# Patient Record
Sex: FEMALE
[De-identification: names, ages, dates, MRNs, and addresses within clinical notes are randomized per-mention and may not be internally consistent; named-entity substitution may affect disease eponyms.]

---

## 2018-05-17 ENCOUNTER — HOSPITAL ENCOUNTER (EMERGENCY)
Dept: HOSPITAL 31 - C.ER | Age: 83
LOS: 1 days | Discharge: HOME | End: 2018-05-18
Payer: MEDICARE

## 2018-05-17 VITALS — TEMPERATURE: 97.7 F

## 2018-05-17 DIAGNOSIS — K29.70: Primary | ICD-10-CM

## 2018-05-17 DIAGNOSIS — I48.91: ICD-10-CM

## 2018-05-17 DIAGNOSIS — T40.4X5A: ICD-10-CM

## 2018-05-17 DIAGNOSIS — R10.9: ICD-10-CM

## 2018-05-17 DIAGNOSIS — I10: ICD-10-CM

## 2018-05-17 LAB
ALBUMIN SERPL-MCNC: 4.3 G/DL (ref 3.5–5)
ALBUMIN/GLOB SERPL: 1.4 {RATIO} (ref 1–2.1)
ALT SERPL-CCNC: 29 U/L (ref 9–52)
ANISOCYTOSIS BLD QL SMEAR: SLIGHT
APTT BLD: 37 SECONDS (ref 21–34)
AST SERPL-CCNC: 24 U/L (ref 14–36)
BASOPHILS # BLD AUTO: 0 K/UL (ref 0–0.2)
BASOPHILS NFR BLD: 0.7 % (ref 0–2)
BASOPHILS NFR BLD: 1 % (ref 0–2)
BILIRUB UR-MCNC: NEGATIVE MG/DL
BUN SERPL-MCNC: 14 MG/DL (ref 7–17)
CALCIUM SERPL-MCNC: 9 MG/DL (ref 8.6–10.4)
EOSINOPHIL # BLD AUTO: 0 K/UL (ref 0–0.7)
EOSINOPHIL NFR BLD: 0 % (ref 0–4)
ERYTHROCYTE [DISTWIDTH] IN BLOOD BY AUTOMATED COUNT: 14.8 % (ref 11.5–14.5)
GFR NON-AFRICAN AMERICAN: > 60
GLUCOSE UR STRIP-MCNC: NORMAL MG/DL
HGB BLD-MCNC: 14.3 G/DL (ref 11–16)
INR PPP: 1.3
LEUKOCYTE ESTERASE UR-ACNC: (no result) LEU/UL
LIPASE: 91 U/L (ref 23–300)
LYMPHOCYTE: 6 % (ref 20–40)
LYMPHOCYTES # BLD AUTO: 0.6 K/UL (ref 1–4.3)
LYMPHOCYTES NFR BLD AUTO: 8 % (ref 20–40)
MCH RBC QN AUTO: 30.8 PG (ref 27–31)
MCHC RBC AUTO-ENTMCNC: 35.1 G/DL (ref 33–37)
MCV RBC AUTO: 87.8 FL (ref 81–99)
MICROCYTES BLD QL SMEAR: SLIGHT
MONOCYTE: 4 % (ref 0–10)
MONOCYTES # BLD: 0.3 K/UL (ref 0–0.8)
MONOCYTES NFR BLD: 4.2 % (ref 0–10)
NEUTROPHILS # BLD: 6.6 K/UL (ref 1.8–7)
NEUTROPHILS NFR BLD AUTO: 87 % (ref 50–75)
NEUTROPHILS NFR BLD AUTO: 87.1 % (ref 50–75)
NEUTS BAND NFR BLD: 2 % (ref 0–2)
NRBC BLD AUTO-RTO: 0.2 % (ref 0–2)
PH UR STRIP: 7 [PH] (ref 5–8)
PLATELET # BLD EST: NORMAL 10*3/UL
PLATELET # BLD: 296 K/UL (ref 130–400)
PMV BLD AUTO: 6.6 FL (ref 7.2–11.7)
PROT UR STRIP-MCNC: NEGATIVE MG/DL
PROTHROMBIN TIME: 14.4 SECONDS (ref 9.7–12.2)
RBC # BLD AUTO: 4.64 MIL/UL (ref 3.8–5.2)
RBC # UR STRIP: (no result) /UL
SP GR UR STRIP: 1.01 (ref 1–1.03)
TOTAL CELLS COUNTED BLD: 100
UROBILINOGEN UR-MCNC: 2 MG/DL (ref 0.2–1)
WBC # BLD AUTO: 7.6 K/UL (ref 4.8–10.8)

## 2018-05-17 PROCEDURE — 85610 PROTHROMBIN TIME: CPT

## 2018-05-17 PROCEDURE — 85025 COMPLETE CBC W/AUTO DIFF WBC: CPT

## 2018-05-17 PROCEDURE — 99285 EMERGENCY DEPT VISIT HI MDM: CPT

## 2018-05-17 PROCEDURE — 85730 THROMBOPLASTIN TIME PARTIAL: CPT

## 2018-05-17 PROCEDURE — 81001 URINALYSIS AUTO W/SCOPE: CPT

## 2018-05-17 PROCEDURE — 80053 COMPREHEN METABOLIC PANEL: CPT

## 2018-05-17 PROCEDURE — 83690 ASSAY OF LIPASE: CPT

## 2018-05-17 PROCEDURE — 96374 THER/PROPH/DIAG INJ IV PUSH: CPT

## 2018-05-17 NOTE — C.PDOC
History Of Present Illness


The patient presents to the ED for evaluation of abdominal pain which began 

earlier today. Patient states she began experiencing pain to her "upper stomach

" region after she took Tramadol for her rib pain. She denies fever, chills, 

nausea, vomiting. 


Time Seen by Provider: 05/17/18 21:38


Chief Complaint (Nursing): Abdominal Pain


History Per: Patient


History/Exam Limitations: no limitations


Onset/Duration Of Symptoms: Hrs


Current Symptoms Are (Timing): Still Present


Severity: Mild


Pain Scale Rating Of: 2


Location Of Pain/Discomfort: Other (upper abdomen )


Radiation Of Pain To:: None


Quality Of Discomfort: "Pain"


Associated Symptoms: denies: Fever, Chills, Nausea, Vomiting


Exacerbating Factors: None


Alleviating Factors: None


Last Bowel Movement: Today


Recent travel outside of the [place]: No


Additional History Per: Patient


Abnormal Vaginal Bleeding: No





Past Medical History


Reviewed: Historical Data, Nursing Documentation, Vital Signs


Vital Signs: 


 Last Vital Signs











Temp  97.7 F   05/17/18 21:33


 


Pulse  72   05/17/18 22:31


 


Resp  20   05/17/18 22:31


 


BP  156/89 H  05/17/18 22:31


 


Pulse Ox  98   05/17/18 23:41














- Medical History


PMH: Arthritis (KNEES), Atrial Fibrillation, HTN


Surgical History: No Surg Hx





- [facility] Procedures








CORONAR ARTERIOGR-2 CATH (03/27/07)


RT & LT HEART ANGIOCARD (03/27/07)


RT/LEFT HEART CARD CATH (03/27/07)








Family History: States: Unknown Family Hx





- Social History


Hx Alcohol Use: No


Hx Substance Use: No





- Immunization History


Hx Tetanus Toxoid Vaccination: No


Hx Influenza Vaccination: Yes


Hx Pneumococcal Vaccination: No





Review Of Systems


Constitutional: Negative for: Fever, Chills


Cardiovascular: Negative for: Chest Pain, Palpitations


Respiratory: Negative for: Cough, Shortness of Breath


Gastrointestinal: Positive for: Abdominal Pain.  Negative for: Nausea, Vomiting

, Diarrhea, Constipation


Musculoskeletal: Positive for: Other (rib pain )


Skin: Negative for: Rash, Lesions, Jaundice, Bruising


Neurological: Negative for: Weakness, Numbness





Physical Exam





- Physical Exam


Appears: Non-toxic, No Acute Distress


Skin: Warm, Dry


Head: Normacephalic


Eye(s): bilateral: Normal Inspection


Oral Mucosa: Moist


Neck: Supple


Chest: Symmetrical, No Deformity, No Tenderness


Cardiovascular: Rhythm Regular, No Murmur


Respiratory: No Rales, No Rhonchi, No Wheezing


Gastrointestinal/Abdominal: Soft, Tenderness (mild, to mid-epigastric region ), 

No Guarding, No Rebound


Extremity: Normal ROM, Capillary Refill (less than 2 seconds )


Neurological/Psych: Oriented x3


Gait: Steady





ED Course And Treatment





- Laboratory Results


Result Diagrams: 


 05/17/18 21:49





 05/17/18 21:49


ECG: Interpreted By Me, Viewed By Me


ECG Rhythm: Sinus Rhythm (75), 1st Degree HB, Nonspecific Changes


O2 Sat by Pulse Oximetry: 98 (on RA)


Pulse Ox Interpretation: Normal


Progress Note: Bloodwork, urinalysis, EKG ordered.  Zofran IVP and IV Fluids 

administered.


Reevaluation Time: 23:37


Reassessment Condition: Improved





Medical Decision Making


Medical Decision Making: 








Upon provider reevaluation patient is feeling better, is medically stable, and 

requires no further treatment in the ED at this time. Patient will be 

discharged home  . Counseling was provided and all questions were answered 

regarding diagnosis and need for follow up with dr murray. There is agreement 

to discharge plan. Return if symptoms persist or worsen.





Disposition


Counseled Patient/Family Regarding: Studies Performed, Diagnosis, Need For 

Followup, Rx Given





- Disposition


Referrals: 


Trae Murray MD [Staff Provider] - 


Disposition: HOME/ ROUTINE


Disposition Time: 21:39


Condition: FAIR


Instructions:  Gastritis (DC), Adverse Drug Reactions, Adult (DC)


Forms:  [facility] (English)





- Clinical Impression


Clinical Impression: 


 Abdominal pain, Gastritis, Medication reaction








- Scribe Statement


The provider has reviewed the documentation as recorded by the Scribe (Heather Corea)


Provider Attestation: 








All medical record entries made by the Scribe were at my direction and 

personally dictated by me. I have reviewed the chart and agree that the record 

accurately reflects my personal performance of the history, physical exam, 

medical decision making, and the department course for this patient. I have 

also personally directed, reviewed, and agree with the discharge instructions 

and disposition.

## 2018-05-18 VITALS
SYSTOLIC BLOOD PRESSURE: 163 MMHG | OXYGEN SATURATION: 95 % | RESPIRATION RATE: 17 BRPM | HEART RATE: 75 BPM | DIASTOLIC BLOOD PRESSURE: 86 MMHG

## 2018-06-28 ENCOUNTER — HOSPITAL ENCOUNTER (EMERGENCY)
Dept: HOSPITAL 31 - C.ER | Age: 83
LOS: 1 days | Discharge: HOME | End: 2018-06-29
Payer: MEDICARE

## 2018-06-28 VITALS — DIASTOLIC BLOOD PRESSURE: 87 MMHG | SYSTOLIC BLOOD PRESSURE: 144 MMHG

## 2018-06-28 DIAGNOSIS — I10: ICD-10-CM

## 2018-06-28 DIAGNOSIS — N39.0: Primary | ICD-10-CM

## 2018-06-28 DIAGNOSIS — I48.91: ICD-10-CM

## 2018-06-28 LAB
ALBUMIN SERPL-MCNC: 4.6 G/DL (ref 3.5–5)
ALBUMIN/GLOB SERPL: 1.5 {RATIO} (ref 1–2.1)
ALT SERPL-CCNC: 35 U/L (ref 9–52)
AST SERPL-CCNC: 33 U/L (ref 14–36)
BASOPHILS # BLD AUTO: 0.1 K/UL (ref 0–0.2)
BASOPHILS NFR BLD: 1.1 % (ref 0–2)
BILIRUB UR-MCNC: NEGATIVE MG/DL
BUN SERPL-MCNC: 11 MG/DL (ref 7–17)
CALCIUM SERPL-MCNC: 9.3 MG/DL (ref 8.6–10.4)
EOSINOPHIL # BLD AUTO: 0 K/UL (ref 0–0.7)
EOSINOPHIL NFR BLD: 0 % (ref 0–4)
ERYTHROCYTE [DISTWIDTH] IN BLOOD BY AUTOMATED COUNT: 14.9 % (ref 11.5–14.5)
GFR NON-AFRICAN AMERICAN: > 60
GLUCOSE UR STRIP-MCNC: NORMAL MG/DL
HGB BLD-MCNC: 14.1 G/DL (ref 11–16)
LEUKOCYTE ESTERASE UR-ACNC: (no result) LEU/UL
LIPASE: 149 U/L (ref 23–300)
LYMPHOCYTES # BLD AUTO: 1 K/UL (ref 1–4.3)
LYMPHOCYTES NFR BLD AUTO: 20.5 % (ref 20–40)
MCH RBC QN AUTO: 29.8 PG (ref 27–31)
MCHC RBC AUTO-ENTMCNC: 33.5 G/DL (ref 33–37)
MCV RBC AUTO: 89 FL (ref 81–99)
MONOCYTES # BLD: 0.5 K/UL (ref 0–0.8)
MONOCYTES NFR BLD: 9.8 % (ref 0–10)
NEUTROPHILS # BLD: 3.3 K/UL (ref 1.8–7)
NEUTROPHILS NFR BLD AUTO: 68.6 % (ref 50–75)
NRBC BLD AUTO-RTO: 0.1 % (ref 0–2)
PH UR STRIP: 7 [PH] (ref 5–8)
PLATELET # BLD: 257 K/UL (ref 130–400)
PMV BLD AUTO: 6.7 FL (ref 7.2–11.7)
PROT UR STRIP-MCNC: NEGATIVE MG/DL
RBC # BLD AUTO: 4.74 MIL/UL (ref 3.8–5.2)
RBC # UR STRIP: (no result) /UL
SP GR UR STRIP: 1 (ref 1–1.03)
UROBILINOGEN UR-MCNC: NORMAL MG/DL (ref 0.2–1)
WBC # BLD AUTO: 4.8 K/UL (ref 4.8–10.8)

## 2018-06-28 NOTE — C.PDOC
History Of Present Illness


89 y/o female presents to the ED via EMS with vague complaint of intermittent 

abdominal pain for the past several months. Pain comes and goes, and is 

occasionally associated with difficulty breathing. Patient's daughter called 

EMS this evening. On arrival, patient has no pain at this time. Patient is a 

very poor historian, and the only PMHx she calls is hypertension. Otherwise 

patient denies any nausea, vomiting, fever, change in bowel habits, or loss of 

appetite.





PMD: Dr. Trae Shepard 


Time Seen by Provider: 06/28/18 19:22


Chief Complaint (Nursing): Abdominal Pain


History Per: Patient


History/Exam Limitations: other (poor historian)


Onset/Duration Of Symptoms: Intermittent Episodes


Current Symptoms Are (Timing): Gone





Past Medical History


Reviewed: Historical Data, Nursing Documentation, Vital Signs


Vital Signs: 


 Last Vital Signs











Temp  98.0 F   06/28/18 19:12


 


Pulse  67   06/28/18 19:12


 


Resp  14   06/28/18 19:12


 


BP  106/67   06/28/18 19:12


 


Pulse Ox  100   06/28/18 22:28














- Medical History


PMH: Arthritis (KNEES), Atrial Fibrillation, HTN


Other Surgeries: Hysterectomy





- [facility] Procedures








CORONAR ARTERIOGR-2 CATH (03/27/07)


RT & LT HEART ANGIOCARD (03/27/07)


RT/LEFT HEART CARD CATH (03/27/07)








Family History: States: Unknown Family Hx





- Social History


Hx Tobacco Use: No


Hx Alcohol Use: No


Hx Substance Use: No





- Immunization History


Hx Tetanus Toxoid Vaccination: No


Hx Influenza Vaccination: Yes


Hx Pneumococcal Vaccination: No





Review Of Systems


Except As Marked, All Systems Reviewed And Found Negative.


Constitutional: Negative for: Fever, Chills


Gastrointestinal: Positive for: Abdominal Pain (now resolved).  Negative for: 

Nausea, Vomiting, Diarrhea, Constipation, Other (change in appetite)





Physical Exam





- Physical Exam


Appears: Non-toxic, No Acute Distress, Other (Awake, alert)


Skin: Dry, Pale, No Cyanotic


Head: Atraumatic, Normacephalic


Eye(s): bilateral: Normal Inspection, PERRL, EOMI


Oral Mucosa: Moist


Neck: Normal ROM, Supple


Chest: Symmetrical


Cardiovascular: No Murmur, Other (S1, S2 are wnl)


Respiratory: Normal Breath Sounds (Lungs CTA bilaterally), No Rales, No Rhonchi

, No Wheezing


Gastrointestinal/Abdominal: Bowel Sounds (active bowel sounds, +tympanic to 

percussion), Soft, No Tenderness, Other (abdomen protuberant, with well-healed 

vertical scar in the midline suprapubic area)


Extremity: Bilateral: Atraumatic, Normal Color And Temperature (without edema, 

clubbing, or cyanosis), Normal ROM


Pulses: Left Dorsalis Pedis: Normal, Right Dorsalis Pedis: Normal


Neurological/Psych: Oriented x3, Normal Speech, Normal Cranial Nerves (CN 2-12 

intact), Other (No focal deficits)





ED Course And Treatment





- Laboratory Results


Result Diagrams: 


 06/28/18 20:00





 06/28/18 20:00


Lab Interpretation: Abnormal (UA indicative of UTI)


O2 Sat by Pulse Oximetry: 100 (RA)


Pulse Ox Interpretation: Normal





- Other Rad


  ** abd x-ray


X-Ray: Interpreted by Me, Viewed By Me


Interpretation: + Nonspecific bowel gas pattern, severe scoliosis, otherwise 

negative.





Medical Decision Making


Medical Decision Making: 


Impression: Abdominal pain, currently resolved





Initial Plan:


--Routine blood work


--Urinalysis


--Obstructive series x-ray





Progress/Updates:


X-ray shows nonspecific bowel gas pattern, no acute findings. 


Blood work is grossly normal. Urine shows + leuks, WBCs, and RBCs.





Final Impression: UTI


Plan is to discharge patient on PO antibiotics. Case was discussed with patient'

s PMD, Dr. Shepard, who agrees with management and will have patient follow up 

in the office. Patient verbalizes understanding of diagnosis and treatment 

plan. 





Disposition


Discussed With .: Trae Shepard


Doctor Will See Patient In The: Office


Counseled Patient/Family Regarding: Studies Performed, Diagnosis, Need For 

Followup, Rx Given





- Disposition


Referrals: 


Trae Shepard MD [Staff Provider] - 


Disposition: HOME/ ROUTINE


Disposition Time: 22:27


Condition: GOOD


Prescriptions: 


Ciprofloxacin HCl [Cipro] 500 mg PO BID #20 tablet


Instructions:  Urinary Tract Infections in Adults


Forms:  [facility] (English)


Print Language: ENGLISH





- POA


Present On Arrival: None





- Clinical Impression


Clinical Impression: 


 UTI (urinary tract infection)








- Scribe Statement


The provider has reviewed the documentation as recorded by the Scribe (Joy Arzate)


Provider Attestation: 





All medical record entries made by the Scribe were at my direction and 

personally dictated by me. I have reviewed the chart and agree that the record 

accurately reflects my personal performance of the history, physical exam, 

medical decision making, and the department course for this patient. I have 

also personally directed, reviewed, and agree with the discharge instructions 

and disposition.

## 2018-06-29 VITALS — RESPIRATION RATE: 20 BRPM | HEART RATE: 78 BPM | TEMPERATURE: 97.8 F

## 2018-06-29 VITALS — OXYGEN SATURATION: 100 %

## 2018-06-29 NOTE — CARD
--------------- APPROVED REPORT --------------





EKG Measurement

Heart [id number]

[id number]

[id number]

[id number]



<Conclusion>

Atrial fibrillation

Abnormal ECG

## 2018-06-29 NOTE — RAD
PROCEDURE:  Radiographs of the chest and abdomen (obstructive series)



HISTORY:

abd pain  



COMPARISON:

No prior.



TECHNIQUE:

AP radiograph of the chest, with upright and supine radiographs of 

the abdomen.



FINDINGS:



CHEST:

Lungs: Clear.



Cardiovascular: Dextro cardia.  Left aortic arch. Shift of the heart 

and mediastinum towards the right side.



Pleura: No pleural fluid. No pneumothorax.



Other findings: None.



ABDOMEN AND PELVIS:

Bowel: Unremarkable bowel gas pattern. No evidence of mechanical 

obstruction.



Free air: None.



Bones:  Status post ORIF right intertrochanteric fracture.



Other findings: None.



IMPRESSION:

No acute infiltrate.  No evidence of bowel obstruction.  Dextro 

cardia with left aortic arch. Shift of heart and mediastinum towards 

the left.

## 2018-07-02 ENCOUNTER — HOSPITAL ENCOUNTER (INPATIENT)
Dept: HOSPITAL 31 - C.ER | Age: 83
LOS: 1 days | Discharge: HOME | DRG: 690 | End: 2018-07-03
Attending: INTERNAL MEDICINE | Admitting: INTERNAL MEDICINE
Payer: MEDICARE

## 2018-07-02 VITALS — RESPIRATION RATE: 20 BRPM

## 2018-07-02 DIAGNOSIS — I48.91: ICD-10-CM

## 2018-07-02 DIAGNOSIS — K56.41: ICD-10-CM

## 2018-07-02 DIAGNOSIS — N18.9: ICD-10-CM

## 2018-07-02 DIAGNOSIS — K57.90: ICD-10-CM

## 2018-07-02 DIAGNOSIS — F41.9: ICD-10-CM

## 2018-07-02 DIAGNOSIS — M17.0: ICD-10-CM

## 2018-07-02 DIAGNOSIS — H40.9: ICD-10-CM

## 2018-07-02 DIAGNOSIS — I12.9: ICD-10-CM

## 2018-07-02 DIAGNOSIS — N39.0: Primary | ICD-10-CM

## 2018-07-02 DIAGNOSIS — D32.9: ICD-10-CM

## 2018-07-02 LAB
ALBUMIN SERPL-MCNC: 4.2 G/DL (ref 3.5–5)
ALBUMIN/GLOB SERPL: 1.5 {RATIO} (ref 1–2.1)
ALT SERPL-CCNC: 28 U/L (ref 9–52)
AST SERPL-CCNC: 28 U/L (ref 14–36)
BACTERIA #/AREA URNS HPF: (no result) /[HPF]
BASOPHILS # BLD AUTO: 0.1 K/UL (ref 0–0.2)
BASOPHILS NFR BLD: 1.3 % (ref 0–2)
BILIRUB UR-MCNC: NEGATIVE MG/DL
BUN SERPL-MCNC: 13 MG/DL (ref 7–17)
CALCIUM SERPL-MCNC: 9 MG/DL (ref 8.6–10.4)
EOSINOPHIL # BLD AUTO: 0 K/UL (ref 0–0.7)
EOSINOPHIL NFR BLD: 0 % (ref 0–4)
ERYTHROCYTE [DISTWIDTH] IN BLOOD BY AUTOMATED COUNT: 14.6 % (ref 11.5–14.5)
GFR NON-AFRICAN AMERICAN: > 60
GLUCOSE UR STRIP-MCNC: NORMAL MG/DL
HGB BLD-MCNC: 13.5 G/DL (ref 11–16)
INR PPP: 2.4
LEUKOCYTE ESTERASE UR-ACNC: (no result) LEU/UL
LIPASE: 118 U/L (ref 23–300)
LYMPHOCYTES # BLD AUTO: 1.1 K/UL (ref 1–4.3)
LYMPHOCYTES NFR BLD AUTO: 20.5 % (ref 20–40)
MCH RBC QN AUTO: 30.1 PG (ref 27–31)
MCHC RBC AUTO-ENTMCNC: 34 G/DL (ref 33–37)
MCV RBC AUTO: 88.6 FL (ref 81–99)
MONOCYTES # BLD: 0.5 K/UL (ref 0–0.8)
MONOCYTES NFR BLD: 9.4 % (ref 0–10)
NEUTROPHILS # BLD: 3.7 K/UL (ref 1.8–7)
NEUTROPHILS NFR BLD AUTO: 68.8 % (ref 50–75)
NRBC BLD AUTO-RTO: 0 % (ref 0–2)
PH UR STRIP: 7 [PH] (ref 5–8)
PLATELET # BLD: 238 K/UL (ref 130–400)
PMV BLD AUTO: 6.7 FL (ref 7.2–11.7)
PROT UR STRIP-MCNC: NEGATIVE MG/DL
PROTHROMBIN TIME: 26.1 SECONDS (ref 9.7–12.2)
RBC # BLD AUTO: 4.48 MIL/UL (ref 3.8–5.2)
RBC # UR STRIP: (no result) /UL
SP GR UR STRIP: 1 (ref 1–1.03)
SQUAMOUS EPITHIAL: < 1 /HPF (ref 0–5)
UROBILINOGEN UR-MCNC: NORMAL MG/DL (ref 0.2–1)
WBC # BLD AUTO: 5.3 K/UL (ref 4.8–10.8)

## 2018-07-02 NOTE — CP.PCM.HP
History of Present Illness





- History of Present Illness


History of Present Illness: 





CC: abd pain





88 y/o female with HTN, At Fib, meningioma, Maricarmen abd pain w/ constipation and 

Anxiety dis.


Patient seen in ER on 6/30 and again last ight for UTI and abd pain. Repeat CT 

showed fecal impaction.


She was advise observation.





Present on Admission





- Present on Admission


Any Indicators Present on Admission: Yes


History of DVT/PE: No


History of Uncontrolled Diabetes: No


Urinary Catheter: No


Decubitus Ulcer Present: No





Review of Systems





- Constitutional


Constitutional: absent: Daytime Sleepiness, Excessive Sweating, Snoring





- EENT


Eyes: absent: Blind Spots, Diplopia, Discharge, Loss of Peripheral Vision, Sees 

Flashes


Ears: Disequilibrium.  absent: Decreased Hearing, Ear Discharge, Ear Pain


Nose/Mouth/Throat: absent: Nasal Congestion, Change in Voice, Hoarsness, 

Odynophagia





- Cardiovascular


Cardiovascular: absent: Chest Pain, Irregular Heart Rhythm, Leg Edema, Leg 

Ulcers, Palpitations, Pedal Edema





- Respiratory


Respiratory: absent: Cough, Dyspnea, Hemoptysis, Excessive Mucous Production, 

Change in Mucous Color





- Gastrointestinal


Gastrointestinal: Abdominal Pain, Belching, Bloating, Cramping, Dyspepsia.  

absent: Diarrhea, Dysphagia, Heartburn, Hematochezia, Nausea, Vomiting





- Genitourinary


Genitourinary: absent: Difficulty Urinating, Dysuria, Hematuria, Nocturia, 

Urinary Urgency





- Musculoskeletal


Musculoskeletal: Abnormal Gait.  absent: Atrophy, Back Pain, Loss of Height, 

Neck Pain





- Integumentary


Integumentary: absent: Hirsutism, New Lesions, Rash, Wounds





- Neurological


Neurological: absent: Abnormal Gait, Dizziness, Numbness, Loss of Vision, 

Restless Legs





- Psychiatric


Psychiatric: absent: Change in Appetite, Depression, Hopelessness, Paranoia





- Hematologic/Lymphatic


Hematologic: absent: Easy Bleeding, Easy Bruising, Lymphadenopathy





Past Patient History





- Infectious Disease


Hx of Infectious Diseases: None





- Past Medical History & Family History


Past Medical History?: Yes





- Past Social History


Smoking Status: Never Smoked





- CARDIAC


Hx Atrial Fibrillation: Yes


Hx Hypertension: Yes





- PULMONARY


Hx Respiratory Disorders: No





- NEUROLOGICAL


Hx Neurological Disorder: Yes


Other/Comment: benign menigioma





- HEENT


Hx HEENT Problems: Yes


Hx Glaucoma: Yes





- RENAL


Hx Chronic Kidney Disease: Yes


Other/Comment: weak bladder





- ENDOCRINE/METABOLIC


Hx Endocrine Disorders: No





- HEMATOLOGICAL/ONCOLOGICAL


Hx Blood Disorders: No





- INTEGUMENTARY


Hx Dermatological Problems: No





- MUSCULOSKELETAL/RHEUMATOLOGICAL


Hx Arthritis: Yes (KNEES)





- GASTROINTESTINAL


Hx Gastrointestinal Disorders: No





- GENITOURINARY/GYNECOLOGICAL


Hx Genitourinary Disorders: Yes


Hx Urinary Tract Infection: Yes





- PSYCHIATRIC


Hx Substance Use: No





- SURGICAL HISTORY


Hx Surgeries: Yes


Hx Hysterectomy: Yes


Other/Comment: BREAST AND ABD BIOPSIES (BENIGN)





- ANESTHESIA


Hx Anesthesia: Yes





Meds


Allergies/Adverse Reactions: 


 Allergies











Allergy/AdvReac Type Severity Reaction Status Date / Time


 


No Known Allergies Allergy   Verified 07/02/18 03:39














Physical Exam





- Constitutional


Appears: No Acute Distress





- Eye Exam


Eye Exam: Normal appearance





- ENT Exam


ENT Exam: Mucous Membranes Moist





- Neck Exam


Neck exam: Positive for: Full Rom.  Negative for: Lymphadenopathy, Normal 

Inspection





- Respiratory Exam


Respiratory Exam: Clear to Auscultation Bilateral.  absent: Rales, Rhonchi, 

Wheezes





- Cardiovascular Exam


Cardiovascular Exam: Irregular Rhythm, +S1, +S2, Systolic Murmur.  absent: 

Diastolic murmur, Gallop, JVD





- GI/Abdominal Exam


GI & Abdominal Exam: Soft, Tenderness.  absent: Rebound, Rigid





Results





- Vital Signs


Recent Vital Signs: 





 Last Vital Signs











Temp  97.6 F   07/02/18 08:42


 


Pulse  65   07/02/18 08:42


 


Resp  20   07/02/18 08:42


 


BP  160/82 H  07/02/18 08:42


 


Pulse Ox  97   07/02/18 08:42














- Labs


Result Diagrams: 


 07/02/18 04:01





 07/02/18 04:01


Labs: 





 Laboratory Results - last 24 hr











  07/02/18 07/02/18 07/02/18





  04:01 04:01 04:45


 


WBC  5.3  


 


RBC  4.48  


 


Hgb  13.5  


 


Hct  39.7  


 


MCV  88.6  


 


MCH  30.1  


 


MCHC  34.0  


 


RDW  14.6 H  


 


Plt Count  238  


 


MPV  6.7 L  


 


Neut % (Auto)  68.8  


 


Lymph % (Auto)  20.5  


 


Mono % (Auto)  9.4  


 


Eos % (Auto)  0.0  


 


Baso % (Auto)  1.3  


 


Neut # (Auto)  3.7  


 


Lymph # (Auto)  1.1  


 


Mono # (Auto)  0.5  


 


Eos # (Auto)  0.0  


 


Baso # (Auto)  0.1  


 


Sodium   138 


 


Potassium   3.6 


 


Chloride   97 L 


 


Carbon Dioxide   29 


 


Anion Gap   15 


 


BUN   13 


 


Creatinine   0.6 L 


 


Est GFR ( Amer)   > 60 


 


Est GFR (Non-Af Amer)   > 60 


 


Random Glucose   91 


 


Calcium   9.0 


 


Total Bilirubin   0.9 


 


AST   28 


 


ALT   28 


 


Alkaline Phosphatase   51 


 


Total Protein   7.1 


 


Albumin   4.2 


 


Globulin   2.9 


 


Albumin/Globulin Ratio   1.5 


 


Lipase   118 


 


Urine Color    Straw


 


Urine Clarity    Clear


 


Urine pH    7.0


 


Ur Specific Gravity    1.003


 


Urine Protein    Negative


 


Urine Glucose (UA)    Normal


 


Urine Ketones    Negative


 


Urine Blood    1+ H


 


Urine Nitrate    Negative


 


Urine Bilirubin    Negative


 


Urine Urobilinogen    Normal


 


Ur Leukocyte Esterase    Neg


 


Urine WBC (Auto)    1


 


Urine RBC (Auto)    4 H


 


Ur Squamous Epith Cells    < 1


 


Urine Bacteria    Rare














- EKG Data


EKG Interpreted by: Myself





- EKG Data


When Compared to Previous EKG: No Significant Change





Assessment & Plan





- Assessment and Plan (Free Text)


Assessment: 





Abd Pain; Constipation; UTI


At Fib; Anxiety dis





Supportive care


restart OPD meds

## 2018-07-02 NOTE — C.PDOC
History Of Present Illness


89 year old female presents to the ED c/o abdominal pain. Patient was seen in 

the ED on 06/28 for similar complaints with a negative workup. Patient 

describes her pain as dull aching. Patient denies fever, chills, nausea, vomit. 

Patient is poor historian. 


Time Seen by Provider: 07/02/18 03:38


History Per: Patient


History/Exam Limitations: no limitations


Onset/Duration Of Symptoms: Days


Current Symptoms Are (Timing): Still Present


Context: Other


Severity: Moderate


Pain Scale Rating Of: 4


Location Of Pain/Discomfort: Diffuse


Radiation Of Pain To:: None


Quality Of Discomfort: Dull, Aching


Associated Symptoms: denies: Nausea, Vomiting, Diarrhea


Exacerbating Factors: None


Alleviating Factors: None


Last Bowel Movement: Days Ago


Recent travel outside of the [place]: No


Additional History Per: Patient


Abnormal Vaginal Bleeding: No





Past Medical History


Reviewed: Historical Data, Nursing Documentation, Vital Signs


Vital Signs: 


 Last Vital Signs











Temp  97.6 F   07/02/18 03:34


 


Pulse  77   07/02/18 03:34


 


Resp  18   07/02/18 03:34


 


BP  157/98 H  07/02/18 03:34


 


Pulse Ox  97   07/02/18 04:54














- Medical History


PMH: Arthritis (KNEES), Atrial Fibrillation, HTN


Surgical History: No Surg Hx





- [facility] Procedures








CORONAR ARTERIOGR-2 CATH (03/27/07)


RT & LT HEART ANGIOCARD (03/27/07)


RT/LEFT HEART CARD CATH (03/27/07)








Family History: States: Unknown Family Hx





- Social History


Hx Tobacco Use: No


Hx Alcohol Use: No


Hx Substance Use: No





- Immunization History


Hx Tetanus Toxoid Vaccination: No


Hx Influenza Vaccination: Yes


Hx Pneumococcal Vaccination: No





Review Of Systems


Constitutional: Negative for: Fever, Chills


Cardiovascular: Negative for: Chest Pain


Respiratory: Negative for: Shortness of Breath


Gastrointestinal: Positive for: Abdominal Pain.  Negative for: Nausea, Vomiting


Musculoskeletal: Negative for: Back Pain


Skin: Negative for: Rash





Physical Exam





- Physical Exam


Appears: Non-toxic, No Acute Distress


Skin: Warm, Dry


Head: Normacephalic


Eye(s): bilateral: Normal Inspection


Oral Mucosa: Moist


Neck: Supple


Chest: Symmetrical


Cardiovascular: Rhythm Irregular


Respiratory: No Rales, No Rhonchi, No Wheezing


Gastrointestinal/Abdominal: Soft, Tenderness (mild diffuse), No Guarding, No 

Rebound, Other (tympanic to percussion)


Back: No CVA Tenderness


Extremity: Normal ROM, No Tenderness, No Swelling


Extremity: Bilateral: Atraumatic


Neurological/Psych: Oriented x3


Gait: With Assistance





ED Course And Treatment





- Laboratory Results


Result Diagrams: 


 07/02/18 04:01





 07/02/18 04:01


ECG: Interpreted By Me, Viewed By Me


ECG Rhythm: Atrial Fibrillation (76), Nonspecific Changes


O2 Sat by Pulse Oximetry: 97 (ON RA)


Pulse Ox Interpretation: Normal


Progress Note: Plan:  - CT abd/pelvis.  - Labs.  - IV fluids.  - UA





Disposition


Discussed With Dr.: Alan Shepard


Comment: accepted the pt on his service and took over the care at  6:55 AM


Doctor Will See Patient In The: Hospital


Counseled Patient/Family Regarding: Studies Performed, Diagnosis





- Disposition


Disposition: HOSPITALIZED


Disposition Time: 03:38


Condition: FAIR





- POA


Present On Arrival: None





- Clinical Impression


Clinical Impression: 


 Nausea, Constipation, Abdominal pain, Atrial fibrillation








- Scribe Statement


The provider has reviewed the documentation as recorded by the Scribe


Sadi Lovell





All medical record entries made by the Scribe were at my direction and 

personally dictated by me. I have reviewed the chart and agree that the record 

accurately reflects my personal performance of the history, physical exam, 

medical decision making, and the department course for this patient. I have 

also personally directed, reviewed, and agree with the discharge instructions 

and disposition.





Physician Patient Turnover


Patient Signed Over To: Alan Shepard


Handoff Comments: pending re-eval, and call back from PMD





Decision To Admit





- Pt Status Changed To:


Hospital Disposition Of: Inpatient





- Admit Certification


Admit to Inpatient:: After my assessment, the patient will require 

hospitalization for at least two midnights.  This is because of the severity of 

symptoms shown, intensity of services needed, and/or the medical risk in this 

patient being treated as an outpatient.





- InPatient:


Physician Admission Certification:: After my assessment, the patient will 

require hospitalization for at least two midnights.  This is because of the 

severity of symptoms shown, intensity of services needed, and/or the medical 

risk in this patient being treated as an outpatient.





- .


Bed Request Type: Regular


Admitting Physician: Alan Shepard


Patient Diagnosis: 


 Nausea, Constipation, Abdominal pain, Atrial fibrillation

## 2018-07-02 NOTE — CT
PROCEDURE:  CT Abdomen and Pelvis without intravenous contrast



HISTORY:

abd pain



COMPARISON:

None.



TECHNIQUE:

Technique. 



Contrast dose: 



Radiation dose:



Total exam DLP =  mGy-cm.



This CT exam was performed using one or more of the following dose 

reduction techniques: Automated exposure control, adjustment of the 

mA and/or kV according to patient size, and/or use of iterative 

reconstruction technique.



FINDINGS:



LOWER THORAX:

Right middle lobe atelectasis.   



LIVER:

Unremarkable. No gross lesion or ductal dilatation.  



GALLBLADDER AND BILE DUCTS:

Unremarkable. 



PANCREAS:

Unremarkable. No gross lesion or ductal dilatation.



SPLEEN:

Unremarkable. 



ADRENALS:

Unremarkable. No mass. 



KIDNEYS AND URETERS:

Multiple bilateral renal cysts. . No hydronephrosis. No solid mass. 



VASCULATURE:

Unremarkable. No aortic aneurysm. 



BOWEL:

Extensive colonic diverticulosis. Abundant stool throughout the 

colon. . No obstruction. No gross mural thickening. 



APPENDIX:

Unremarkable. Normal appendix. 



PERITONEUM:

Unremarkable. No free fluid. No free air. 



LYMPH NODES:

Unremarkable. No enlarged lymph nodes. 



BLADDER:

Unremarkable. 



REPRODUCTIVE:

Unremarkable. 



BONES:

No acute fracture. 



OTHER FINDINGS:

None.



IMPRESSION:

Extensive colonic diverticulosis. Abundant stool throughout the 

colon. Right middle lobe atelectasis.

## 2018-07-03 VITALS — TEMPERATURE: 97.5 F

## 2018-07-03 VITALS — OXYGEN SATURATION: 95 % | DIASTOLIC BLOOD PRESSURE: 83 MMHG | HEART RATE: 75 BPM | SYSTOLIC BLOOD PRESSURE: 149 MMHG

## 2018-07-03 LAB
ALBUMIN SERPL-MCNC: 3.4 G/DL (ref 3.5–5)
ALBUMIN/GLOB SERPL: 1.4 {RATIO} (ref 1–2.1)
ALT SERPL-CCNC: 27 U/L (ref 9–52)
AST SERPL-CCNC: 21 U/L (ref 14–36)
BUN SERPL-MCNC: 12 MG/DL (ref 7–17)
CALCIUM SERPL-MCNC: 8.4 MG/DL (ref 8.6–10.4)
GFR NON-AFRICAN AMERICAN: > 60

## 2018-07-03 NOTE — CARD
--------------- APPROVED REPORT --------------





EKG Measurement

Heart [id number]

[id number]

[id number]

[id number]



<Conclusion>

Atrial fibrillation

Nonspecific T wave abnormality

Abnormal ECG

## 2018-07-03 NOTE — CP.PCM.PN
Subjective





- Date & Time of Evaluation


Date of Evaluation: 07/02/18


Time of Evaluation: 08:00





- Subjective


Subjective: 





Pt no complain; no abd pain this AM


No CP, no SOB, no edema, no cough, no n/v, no dysuria





Objective





- Vital Signs/Intake and Output


Vital Signs (last 24 hours): 


 











Temp Pulse Resp BP Pulse Ox


 


 97.5 F L  75   20   149/83   95 


 


 07/03/18 07:29  07/03/18 07:29  07/03/18 07:29  07/03/18 07:29  07/03/18 07:29








Intake and Output: 


 











 07/03/18 07/03/18





 06:59 18:59


 


Intake Total 900 


 


Balance 900 














- Medications


Medications: 


 Current Medications





Amlodipine Besylate (Norvasc)  10 mg PO DAILY [facility]


   Last Admin: 07/02/18 12:52 Dose:  10 mg


Atenolol (Tenormin)  50 mg PO DAILY [facility]


   Last Admin: 07/02/18 12:51 Dose:  50 mg


Ciprofloxacin (Cipro)  500 mg PO BID [facility]


   PRN Reason: Protocol


   Last Admin: 07/02/18 17:03 Dose:  500 mg


Diazepam (Valium)  5 mg PO QAM [facility]


   Last Admin: 07/02/18 10:20 Dose:  5 mg


Diazepam (Valium)  10 mg PO QPM [facility]


   Last Admin: 07/02/18 17:03 Dose:  10 mg











- Labs


Labs: 


 





 07/02/18 04:01 





 07/02/18 04:01 





 











PT  26.1 SECONDS (9.7-12.2)  H  07/02/18  11:04    


 


INR  2.4   07/02/18  11:04    














- Constitutional


Appears: No Acute Distress





- Eye Exam


Eye Exam: Normal appearance





- ENT Exam


ENT Exam: Mucous Membranes Moist





- Neck Exam


Neck Exam: Full ROM, Normal Inspection.  absent: Thyromegaly





- Respiratory Exam


Respiratory Exam: Clear to Ausculation Bilateral.  absent: Rales, Rhonchi, 

Wheezes





- Cardiovascular Exam


Cardiovascular Exam: Irregular Rhythm, +S1, +S2, Murmur.  absent: Gallop, JVD





- GI/Abdominal Exam


GI & Abdominal Exam: Soft.  absent: Tenderness





- Extremities Exam


Extremities Exam: Full ROM, Normal Capillary Refill.  absent: Calf Tenderness, 

Joint Swelling





Assessment and Plan





- Assessment and Plan (Free Text)


Assessment: 





Abd pain; Anxiety dis


HTN, At fib; diverticulosis





Cont meds


Restart Warfarin


For discharge

## 2018-07-06 ENCOUNTER — HOSPITAL ENCOUNTER (EMERGENCY)
Dept: HOSPITAL 31 - C.ER | Age: 83
Discharge: HOME | End: 2018-07-06
Payer: MEDICARE

## 2018-07-06 VITALS — HEART RATE: 61 BPM | TEMPERATURE: 97.9 F | SYSTOLIC BLOOD PRESSURE: 132 MMHG | DIASTOLIC BLOOD PRESSURE: 68 MMHG

## 2018-07-06 VITALS — OXYGEN SATURATION: 99 %

## 2018-07-06 VITALS — RESPIRATION RATE: 20 BRPM

## 2018-07-06 DIAGNOSIS — K59.00: Primary | ICD-10-CM

## 2018-07-06 LAB
BILIRUB UR-MCNC: NEGATIVE MG/DL
GLUCOSE UR STRIP-MCNC: NORMAL MG/DL
LEUKOCYTE ESTERASE UR-ACNC: (no result) LEU/UL
PH UR STRIP: 6 [PH] (ref 5–8)
PROT UR STRIP-MCNC: NEGATIVE MG/DL
RBC # UR STRIP: (no result) /UL
SP GR UR STRIP: 1 (ref 1–1.03)
SQUAMOUS EPITHIAL: 1 /HPF (ref 0–5)
UROBILINOGEN UR-MCNC: NORMAL MG/DL (ref 0.2–1)

## 2018-07-06 NOTE — C.PDOC
History Of Present Illness


88yo female, with history of AFib, HTN, comes to ER for evaluation of a vague 

abdominal pain. Patient states she has been constipated and has not made a 

bowel movement in 3 days. She denies any fever, chills, vomiting, hematemesis, 

or bloody stools. She has no other complaints.


Time Seen by Provider: 07/06/18 12:38


Chief Complaint (Nursing): GI Problem


History Per: Patient


History/Exam Limitations: no limitations


Onset/Duration Of Symptoms: Unknown


Current Symptoms Are (Timing): Still Present


Location Of Pain/Discomfort: Diffuse


Quality Of Discomfort: "Pain"


Associated Symptoms: Constipation.  denies: Fever, Chills, Nausea, Vomiting, 

Diarrhea, Chest Pain


Additional History Per: Patient





Past Medical History


Reviewed: Historical Data, Nursing Documentation, Vital Signs


Vital Signs: 


 Last Vital Signs











Temp  97.9 F   07/06/18 15:24


 


Pulse  61   07/06/18 15:24


 


Resp  20   07/06/18 15:24


 


BP  132/68   07/06/18 15:24


 


Pulse Ox  99   07/06/18 16:07














- Medical History


PMH: Arthritis, Atrial Fibrillation, HTN, Chronic Kidney Disease


Surgical History: No Surg Hx





- [facility] Procedures








CORONAR ARTERIOGR-2 CATH (03/27/07)


RT & LT HEART ANGIOCARD (03/27/07)


RT/LEFT HEART CARD CATH (03/27/07)








Family History: States: No Known Family Hx, Unknown Family Hx





- Social History


Hx Tobacco Use: No


Hx Alcohol Use: No


Hx Substance Use: No





- Immunization History


Hx Tetanus Toxoid Vaccination: No


Hx Influenza Vaccination: Yes


Hx Pneumococcal Vaccination: No





Review Of Systems


Except As Marked, All Systems Reviewed And Found Negative.


Constitutional: Negative for: Fever, Chills


Cardiovascular: Negative for: Chest Pain


Respiratory: Negative for: Shortness of Breath


Gastrointestinal: Positive for: Abdominal Pain, Constipation.  Negative for: 

Nausea, Vomiting, Diarrhea, Hematochezia, Hematemesis





Physical Exam





- Physical Exam


Appears: Non-toxic, No Acute Distress


Skin: Warm, Dry


Head: Normacephalic


Eye(s): bilateral: Normal Inspection


Neck: Supple


Chest: Symmetrical


Cardiovascular: Rhythm Regular


Respiratory: Normal Breath Sounds


Gastrointestinal/Abdominal: Soft, Tenderness (diffuse abdominal tenderness), No 

Mass, No Guarding, No Rebound


Extremity: Normal ROM


Neurological/Psych: Oriented x3





ED Course And Treatment


O2 Sat by Pulse Oximetry: 99 (RA)


Pulse Ox Interpretation: Normal





Medical Decision Making


Medical Decision Making: 


Impression: Abdominal pain





Prior records reviewed, patient has been in this ER 5x over the past 2 months 

for similar complaints. She had a CT Abdomen/Pelvis performed on 7/2 which 

indicated colonic diverticulosis and abundant stool throughout the colon.





Plan:


-- XR Obstructive series


-- Urinalysis





Case discussed with Vinod Peterson and Dr. Lawrence who are both agreeable with 

plan for discharge home. Spoke with patient's daughter who was informed of need 

to take medications, including miralax and linzess, as prescribed. Patient to 

follow up with her PMD in 2-3 days.








Disposition


Discussed With .: Alan Shepard


Doctor Will See Patient In The: Office





- Disposition


Referrals: 


Alan Shepard MD [Staff Provider] - 


Matias Cerrato MD [Staff Provider] - 


Disposition: HOME/ ROUTINE


Disposition Time: 16:06


Condition: STABLE


Additional Instructions: 


Use Miralax twice q day. 


Use Benefiber daily. 


Contact Dr. Cerrato as needed. 


Instructions:  Constipation in Adults


Forms:  [facility] (English), General Discharge Instructions





- POA


Present On Arrival: None





- Clinical Impression


Clinical Impression: 


 Constipation








- Scribe Statement


The provider has reviewed the documentation as recorded by the Oksana Live


Provider Attestation: 


All medical record entries made by the Oksana were at my direction and 

personally dictated by me. I have reviewed the chart and agree that the record 

accurately reflects my personal performance of the history, physical exam, 

medical decision making, and the department course for this patient. I have 

also personally directed, reviewed, and agree with the discharge instructions 

and disposition.

## 2018-07-17 ENCOUNTER — HOSPITAL ENCOUNTER (EMERGENCY)
Dept: HOSPITAL 31 - C.ER | Age: 83
Discharge: HOME | End: 2018-07-17
Payer: MEDICARE

## 2018-07-17 VITALS — DIASTOLIC BLOOD PRESSURE: 72 MMHG | HEART RATE: 68 BPM | SYSTOLIC BLOOD PRESSURE: 122 MMHG | TEMPERATURE: 97.6 F

## 2018-07-17 VITALS — RESPIRATION RATE: 18 BRPM | OXYGEN SATURATION: 98 %

## 2018-07-17 DIAGNOSIS — I12.9: ICD-10-CM

## 2018-07-17 DIAGNOSIS — K59.00: Primary | ICD-10-CM

## 2018-07-17 DIAGNOSIS — I48.91: ICD-10-CM

## 2018-07-17 DIAGNOSIS — N18.9: ICD-10-CM

## 2018-07-17 LAB
BACTERIA #/AREA URNS HPF: (no result) /[HPF]
BILIRUB UR-MCNC: NEGATIVE MG/DL
GLUCOSE UR STRIP-MCNC: NORMAL MG/DL
LEUKOCYTE ESTERASE UR-ACNC: (no result) LEU/UL
PH UR STRIP: 7 [PH] (ref 5–8)
PROT UR STRIP-MCNC: NEGATIVE MG/DL
RBC # UR STRIP: (no result) /UL
SP GR UR STRIP: 1 (ref 1–1.03)
UROBILINOGEN UR-MCNC: NORMAL MG/DL (ref 0.2–1)

## 2018-07-17 NOTE — C.PDOC
History Of Present Illness


89 year old female with history of multiple medical problems, is sent from home 

for evaluation of abdominal pain which began last night. Patient has history of 

chronic constipation and has been seen by Dr. Cerrato (GI). Patient states she 

had not been able to move her bowels for the past 3 days and is taking Miralax. 

Patient complains of a 10/10 pain to her abdominal and vaginal areas, but 

states pain has currently resolved in the ED. Patient also reports recent 

history of UTI; she was using Macrobid but stopped treatment because she 

developed vaginal itching. Patient denies fever, chills, nausea and vomiting. 


Time Seen by Provider: 07/17/18 11:18


Chief Complaint (Nursing): Abdominal Pain


History Per: Patient


History/Exam Limitations: no limitations


Onset/Duration Of Symptoms: Hrs


Current Symptoms Are (Timing): Better


Pain Scale Rating Of: 10


Location Of Pain/Discomfort: Diffuse


Quality Of Discomfort: "Pain"


Associated Symptoms: Constipation.  denies: Fever, Chills, Nausea, Vomiting


Last Bowel Movement: Days Ago (3)


Additional History Per: Patient





Past Medical History


Reviewed: Historical Data, Nursing Documentation, Vital Signs


Vital Signs: 


 Last Vital Signs











Temp  97.4 F L  07/17/18 11:09


 


Pulse  65   07/17/18 11:09


 


Resp  18   07/17/18 11:09


 


BP  129/75   07/17/18 11:09


 


Pulse Ox  98   07/17/18 13:48














- Medical History


PMH: Arthritis, Atrial Fibrillation, HTN, Chronic Kidney Disease


Surgical History: No Surg Hx





- [facility] Procedures








CORONAR ARTERIOGR-2 CATH (03/27/07)


RT & LT HEART ANGIOCARD (03/27/07)


RT/LEFT HEART CARD CATH (03/27/07)








Family History: States: Unknown Family Hx





- Social History


Hx Tobacco Use: No


Hx Alcohol Use: No


Hx Substance Use: No





- Immunization History


Hx Tetanus Toxoid Vaccination: No


Hx Influenza Vaccination: Yes


Hx Pneumococcal Vaccination: No





Review Of Systems


Constitutional: Negative for: Fever, Chills


Gastrointestinal: Positive for: Abdominal Pain, Constipation.  Negative for: 

Nausea, Vomiting


Genitourinary: Positive for: Other (vaginal pain )





Physical Exam





- Physical Exam


Appears: Non-toxic, No Acute Distress, Other (frail )


Skin: Normal Color, Warm, Dry


Head: Atraumatic, Normacephalic


Eye(s): bilateral: Normal Inspection


Oral Mucosa: Moist


Neck: Supple


Chest: Symmetrical, No Deformity, No Tenderness


Cardiovascular: Rhythm Regular, No Murmur


Respiratory: Normal Breath Sounds, No Rales, No Rhonchi, No Wheezing


Gastrointestinal/Abdominal: Soft, No Tenderness, No Guarding, No Rebound, Other 

(no muscle mass in abdominal wall, colon full of stool along the entire course )


Extremity: Normal ROM, Capillary Refill (less than 2 seconds )


Neurological/Psych: Oriented x3, Normal Speech, Normal Cognition, Other (calm, 

cooperative )





ED Course And Treatment


O2 Sat by Pulse Oximetry: 98 (on RA)


Pulse Ox Interpretation: Normal





Medical Decision Making


Medical Decision Making: 





Impression: 89 year old female with abdominal and vaginal pain 


Plan: 


* obstructive series abdomen 


* urinalysis 


* reassess and disposition 





Progress: 


Obstructive Series Abdomen and Urinalysis ordered and reviewed. 


Pending callback from Dr. Shepard. 





Spoke with daughter, patient frequently c/o abdominal pain and constipation. 

Difficult getting her to her doctor's appointments, . 


Spoke with Dr. OSWALDO Shepard, agrees patient can go home. 





Disposition


Counseled Patient/Family Regarding: Diagnosis, Need For Followup, Rx Given





- Disposition


Referrals: 


Trae Shepard MD [Staff Provider] - 


Disposition: HOME/ ROUTINE


Disposition Time: 14:31


Condition: STABLE


Instructions:  Constipation, Adult (DC)


Forms:  [facility] (English), General Discharge Instructions





- Clinical Impression


Clinical Impression: 


 Constipation








- Scribe Statement


The provider has reviewed the documentation as recorded by the Scribe (Heather Corea)


Provider Attestation: 








All medical record entries made by the Scribe were at my direction and 

personally dictated by me. I have reviewed the chart and agree that the record 

accurately reflects my personal performance of the history, physical exam, 

medical decision making, and the department course for this patient. I have 

also personally directed, reviewed, and agree with the discharge instructions 

and disposition.

## 2018-07-17 NOTE — RAD
Date of service: 



07/17/2018



PROCEDURE:  Radiographs of the chest and abdomen (obstructive series)



HISTORY:

abdominal pain, constipation  



COMPARISON:

05/14/2018 single-view chest



TECHNIQUE:

AP radiograph of the chest, with upright and supine radiographs of 

the abdomen.



FINDINGS:



CHEST:

Lungs: Clear.



Cardiovascular: Dextro cardia.



Pleura: No pleural fluid. No pneumothorax.



Other findings: None.



ABDOMEN AND PELVIS:

Bowel: Unremarkable bowel gas pattern. No evidence of mechanical 

obstruction.



Free air: None.



Bones:  Severe thoracolumbar scoliosis, secondary degenerative change.



Other findings: None.



IMPRESSION:

No significant or acute  findings to account for/ related to the 

clinical presentation.



Additional benign and/or incidental findings described above.

## 2018-07-30 ENCOUNTER — HOSPITAL ENCOUNTER (EMERGENCY)
Dept: HOSPITAL 31 - C.ER | Age: 83
LOS: 1 days | Discharge: HOME | End: 2018-07-31
Payer: MEDICARE

## 2018-07-30 VITALS — OXYGEN SATURATION: 96 %

## 2018-07-30 VITALS — RESPIRATION RATE: 14 BRPM

## 2018-07-30 DIAGNOSIS — I48.91: ICD-10-CM

## 2018-07-30 DIAGNOSIS — K59.00: ICD-10-CM

## 2018-07-30 DIAGNOSIS — I12.9: ICD-10-CM

## 2018-07-30 DIAGNOSIS — N18.9: ICD-10-CM

## 2018-07-30 DIAGNOSIS — R10.9: Primary | ICD-10-CM

## 2018-07-30 LAB
ALBUMIN SERPL-MCNC: 4.1 G/DL (ref 3.5–5)
ALBUMIN/GLOB SERPL: 1.5 {RATIO} (ref 1–2.1)
ALT SERPL-CCNC: 12 U/L (ref 9–52)
AST SERPL-CCNC: 42 U/L (ref 14–36)
BACTERIA #/AREA URNS HPF: (no result) /[HPF]
BASOPHILS # BLD AUTO: 0.1 K/UL (ref 0–0.2)
BASOPHILS NFR BLD: 1.4 % (ref 0–2)
BILIRUB UR-MCNC: NEGATIVE MG/DL
BUN SERPL-MCNC: 15 MG/DL (ref 7–17)
CALCIUM SERPL-MCNC: 8.7 MG/DL (ref 8.6–10.4)
EOSINOPHIL # BLD AUTO: 0 K/UL (ref 0–0.7)
EOSINOPHIL NFR BLD: 0.1 % (ref 0–4)
ERYTHROCYTE [DISTWIDTH] IN BLOOD BY AUTOMATED COUNT: 14.6 % (ref 11.5–14.5)
GFR NON-AFRICAN AMERICAN: > 60
GLUCOSE UR STRIP-MCNC: NORMAL MG/DL
HGB BLD-MCNC: 12.5 G/DL (ref 11–16)
LEUKOCYTE ESTERASE UR-ACNC: (no result) LEU/UL
LIPASE: 150 U/L (ref 23–300)
LYMPHOCYTES # BLD AUTO: 1 K/UL (ref 1–4.3)
LYMPHOCYTES NFR BLD AUTO: 18.1 % (ref 20–40)
MCH RBC QN AUTO: 30.4 PG (ref 27–31)
MCHC RBC AUTO-ENTMCNC: 34.4 G/DL (ref 33–37)
MCV RBC AUTO: 88.4 FL (ref 81–99)
MONOCYTES # BLD: 0.5 K/UL (ref 0–0.8)
MONOCYTES NFR BLD: 9.5 % (ref 0–10)
NEUTROPHILS # BLD: 3.7 K/UL (ref 1.8–7)
NEUTROPHILS NFR BLD AUTO: 70.9 % (ref 50–75)
NRBC BLD AUTO-RTO: 0.1 % (ref 0–2)
PH UR STRIP: 7 [PH] (ref 5–8)
PLATELET # BLD: 252 K/UL (ref 130–400)
PMV BLD AUTO: 7 FL (ref 7.2–11.7)
PROT UR STRIP-MCNC: NEGATIVE MG/DL
RBC # BLD AUTO: 4.12 MIL/UL (ref 3.8–5.2)
RBC # UR STRIP: (no result) /UL
SP GR UR STRIP: 1 (ref 1–1.03)
SQUAMOUS EPITHIAL: < 1 /HPF (ref 0–5)
UROBILINOGEN UR-MCNC: NORMAL MG/DL (ref 0.2–1)
WBC # BLD AUTO: 5.3 K/UL (ref 4.8–10.8)

## 2018-07-30 PROCEDURE — 81001 URINALYSIS AUTO W/SCOPE: CPT

## 2018-07-30 PROCEDURE — 96374 THER/PROPH/DIAG INJ IV PUSH: CPT

## 2018-07-30 PROCEDURE — 93005 ELECTROCARDIOGRAM TRACING: CPT

## 2018-07-30 PROCEDURE — 80053 COMPREHEN METABOLIC PANEL: CPT

## 2018-07-30 PROCEDURE — 99285 EMERGENCY DEPT VISIT HI MDM: CPT

## 2018-07-30 PROCEDURE — 83690 ASSAY OF LIPASE: CPT

## 2018-07-30 PROCEDURE — 74022 RADEX COMPL AQT ABD SERIES: CPT

## 2018-07-30 PROCEDURE — 85025 COMPLETE CBC W/AUTO DIFF WBC: CPT

## 2018-07-30 NOTE — C.PDOC
History Of Present Illness


88 y/o female presents to the ED with complaints of intermittent abdominal pain

, ongoing for months. She denies any associated fever, chills, vomiting, 

diarrhea, dysuria, or frequency. Pain is described as a dull, crampy 

discomfort. Of note patient was seen here on 7/17 and discharged home with 

diagnosis of constipation. She has had numerous visits to the ED for similar 

complaints. 


Time Seen by Provider: 07/30/18 20:24


Chief Complaint (Nursing): Abdominal Pain


History Per: Patient


History/Exam Limitations: no limitations


Onset/Duration Of Symptoms: Intermittent Episodes


Current Symptoms Are (Timing): Still Present


Severity: Mild


Pain Scale Rating Of: 4


Location Of Pain/Discomfort: Diffuse


Radiation Of Pain To:: None


Quality Of Discomfort: Dull, Cramping


Associated Symptoms: denies: Fever, Chills


Alleviating Factors: None


Additional History Per: Prior Records


Abnormal Vaginal Bleeding: No





Past Medical History


Reviewed: Historical Data, Nursing Documentation, Vital Signs


Vital Signs: 


 Last Vital Signs











Temp  97.6 F   07/30/18 19:41


 


Pulse  68   07/30/18 23:04


 


Resp  14   07/30/18 23:04


 


BP  139/90   07/30/18 23:04


 


Pulse Ox  96   07/31/18 01:19














- Medical History


PMH: Arthritis, Atrial Fibrillation, HTN, Chronic Kidney Disease





- [facility] Procedures








CORONAR ARTERIOGR-2 CATH (03/27/07)


RT & LT HEART ANGIOCARD (03/27/07)


RT/LEFT HEART CARD CATH (03/27/07)








Family History: States: Unknown Family Hx





- Social History


Hx Tobacco Use: No


Hx Alcohol Use: No


Hx Substance Use: No





- Immunization History


Hx Tetanus Toxoid Vaccination: No


Hx Influenza Vaccination: Yes


Hx Pneumococcal Vaccination: No





Review Of Systems


Constitutional: Negative for: Fever, Chills


Gastrointestinal: Positive for: Abdominal Pain.  Negative for: Nausea, Vomiting

, Diarrhea


Genitourinary: Negative for: Dysuria, Frequency, Incontinence





Physical Exam





- Physical Exam


Appears: Non-toxic, No Acute Distress


Skin: Warm, Dry


Head: Normacephalic


Eye(s): bilateral: Normal Inspection


Oral Mucosa: Dry


Neck: Trachea Midline, Supple


Chest: Symmetrical


Cardiovascular: Rhythm Regular


Respiratory: No Rales, No Rhonchi, No Wheezing


Gastrointestinal/Abdominal: Bowel Sounds (tympanic to percussion), Soft, No 

Tenderness, No Guarding, No Rebound


Extremity: Bilateral: Atraumatic, Normal Color And Temperature


Pulses: Left Dorsalis Pedis: Normal, Right Dorsalis Pedis: Normal


Neurological/Psych: Oriented x3





ED Course And Treatment





- Laboratory Results


Result Diagrams: 


 07/30/18 21:00





 07/30/18 21:00


O2 Sat by Pulse Oximetry: 96 (RA)


Pulse Ox Interpretation: Normal





- Radiology


CXR: Interpreted by Me, Viewed By Me


CXR Interpretation: No: Infiltrates, Fracture, Pnemothorax





- Other Rad


  ** obstr


X-Ray: Interpreted by Me, Viewed By Me


Interpretation: no free air, lots of stool, no obstr


Progress Note: Blood work and urine sent for analysis. EKG ordered and 

reviewed. 1L of IV fluids administered.


Reevaluation Time: 01:18


Reassessment Condition: Improved





Medical Decision Making


Medical Decision Making: 





Upon provider reevaluation patient is feeling better, is medically stable, and 

requires no further treatment in the ED at this time. Patient will be 

discharged home with Rx for  miralax. Counseling was provided and all questions 

were answered regarding diagnosis and need for follow up with dr murray. There 

is agreement to discharge plan. Return if symptoms persist or worsen.





Disposition


Counseled Patient/Family Regarding: Studies Performed, Diagnosis, Need For 

Followup, Rx Given





- Disposition


Referrals: 


Trae Murray MD [Staff Provider] - 


Disposition: HOME/ ROUTINE


Disposition Time: 20:24


Condition: FAIR


Additional Instructions: 


Please return if symptoms recur








Prescriptions: 


Polyethylene Glycol 3350 [Miralax] 17 gm PO DAILY #270 ml


Instructions:  Constipation, Adult (DC), Acute Abdomen (Belly Pain), Adult (DC)


Forms:  [facility] (English)





- Clinical Impression


Clinical Impression: 


 Abdominal pain, Constipation








- Scribe Statement


The provider has reviewed the documentation as recorded by the Scribe (Joy Arzate)


Provider Attestation: 





All medical record entries made by the Scribe were at my direction and 

personally dictated by me. I have reviewed the chart and agree that the record 

accurately reflects my personal performance of the history, physical exam, 

medical decision making, and the department course for this patient. I have 

also personally directed, reviewed, and agree with the discharge instructions 

and disposition.

## 2018-07-31 VITALS — HEART RATE: 80 BPM | SYSTOLIC BLOOD PRESSURE: 147 MMHG | TEMPERATURE: 98.5 F | DIASTOLIC BLOOD PRESSURE: 86 MMHG

## 2018-07-31 NOTE — RAD
Date of service: 



07/31/2018



PROCEDURE:  Radiographs of the chest and abdomen (obstructive series)



HISTORY:

White what is today Tuesday Carlos item are no proper



COMPARISON:

No prior.



TECHNIQUE:

AP radiograph of the chest, with upright and supine radiographs of 

the abdomen.



FINDINGS:



CHEST:

Lungs: Clear.



Cardiovascular: Normal size heart. No pulmonary vascular congestion.



Pleura: No pleural fluid. No pneumothorax.



Other findings: None.



ABDOMEN AND PELVIS:

Bowel: Unremarkable bowel gas pattern. No evidence of mechanical 

obstruction.



Free air: None.



Bones:  Unremarkable.



Other findings: None.



IMPRESSION:

Unremarkable radiographs of chest and abdomen. No evidence of 

mechanical bowel obstruction.

## 2018-08-02 NOTE — CARD
--------------- APPROVED REPORT --------------





Date of service: 07/31/2018



EKG Measurement

Heart [id number]

[id number]

[id number]

[id number]



<Conclusion>

Atrial fibrillation

Nonspecific T wave abnormality

Abnormal ECG

## 2018-09-26 ENCOUNTER — HOSPITAL ENCOUNTER (EMERGENCY)
Dept: HOSPITAL 31 - C.ER | Age: 83
Discharge: HOME | End: 2018-09-26
Payer: MEDICARE

## 2018-09-26 VITALS
DIASTOLIC BLOOD PRESSURE: 70 MMHG | HEART RATE: 88 BPM | OXYGEN SATURATION: 96 % | TEMPERATURE: 98 F | RESPIRATION RATE: 16 BRPM | SYSTOLIC BLOOD PRESSURE: 100 MMHG

## 2018-09-26 DIAGNOSIS — K59.00: Primary | ICD-10-CM

## 2018-09-26 DIAGNOSIS — I12.9: ICD-10-CM

## 2018-09-26 DIAGNOSIS — N18.9: ICD-10-CM

## 2018-09-26 DIAGNOSIS — I48.91: ICD-10-CM

## 2018-09-26 LAB
ALBUMIN SERPL-MCNC: 4 G/DL (ref 3.5–5)
ALBUMIN/GLOB SERPL: 1.5 {RATIO} (ref 1–2.1)
ALT SERPL-CCNC: 31 U/L (ref 9–52)
AST SERPL-CCNC: 26 U/L (ref 14–36)
BASOPHILS # BLD AUTO: 0.1 K/UL (ref 0–0.2)
BASOPHILS NFR BLD: 1 % (ref 0–2)
BILIRUB UR-MCNC: NEGATIVE MG/DL
BUN SERPL-MCNC: 12 MG/DL (ref 7–17)
CALCIUM SERPL-MCNC: 9.2 MG/DL (ref 8.6–10.4)
EOSINOPHIL # BLD AUTO: 0 K/UL (ref 0–0.7)
EOSINOPHIL NFR BLD: 0 % (ref 0–4)
ERYTHROCYTE [DISTWIDTH] IN BLOOD BY AUTOMATED COUNT: 15 % (ref 11.5–14.5)
GFR NON-AFRICAN AMERICAN: > 60
GLUCOSE UR STRIP-MCNC: NORMAL MG/DL
HGB BLD-MCNC: 13.6 G/DL (ref 11–16)
LEUKOCYTE ESTERASE UR-ACNC: (no result) LEU/UL
LIPASE: 126 U/L (ref 23–300)
LYMPHOCYTES # BLD AUTO: 0.7 K/UL (ref 1–4.3)
LYMPHOCYTES NFR BLD AUTO: 12.6 % (ref 20–40)
MCH RBC QN AUTO: 30.6 PG (ref 27–31)
MCHC RBC AUTO-ENTMCNC: 34.5 G/DL (ref 33–37)
MCV RBC AUTO: 88.8 FL (ref 81–99)
MONOCYTES # BLD: 0.5 K/UL (ref 0–0.8)
MONOCYTES NFR BLD: 9.7 % (ref 0–10)
NEUTROPHILS # BLD: 4.1 K/UL (ref 1.8–7)
NEUTROPHILS NFR BLD AUTO: 76.7 % (ref 50–75)
NRBC BLD AUTO-RTO: 0.1 % (ref 0–2)
PH UR STRIP: 7 [PH] (ref 5–8)
PLATELET # BLD: 253 K/UL (ref 130–400)
PMV BLD AUTO: 6.4 FL (ref 7.2–11.7)
PROT UR STRIP-MCNC: NEGATIVE MG/DL
RBC # BLD AUTO: 4.44 MIL/UL (ref 3.8–5.2)
RBC # UR STRIP: (no result) /UL
SP GR UR STRIP: 1 (ref 1–1.03)
UROBILINOGEN UR-MCNC: NORMAL MG/DL (ref 0.2–1)
WBC # BLD AUTO: 5.3 K/UL (ref 4.8–10.8)

## 2018-09-26 PROCEDURE — 83690 ASSAY OF LIPASE: CPT

## 2018-09-26 PROCEDURE — 84484 ASSAY OF TROPONIN QUANT: CPT

## 2018-09-26 PROCEDURE — 85025 COMPLETE CBC W/AUTO DIFF WBC: CPT

## 2018-09-26 PROCEDURE — 81001 URINALYSIS AUTO W/SCOPE: CPT

## 2018-09-26 PROCEDURE — 87086 URINE CULTURE/COLONY COUNT: CPT

## 2018-09-26 PROCEDURE — 93005 ELECTROCARDIOGRAM TRACING: CPT

## 2018-09-26 PROCEDURE — 99285 EMERGENCY DEPT VISIT HI MDM: CPT

## 2018-09-26 PROCEDURE — 80053 COMPREHEN METABOLIC PANEL: CPT

## 2018-09-26 PROCEDURE — 74177 CT ABD & PELVIS W/CONTRAST: CPT

## 2018-09-26 NOTE — CT
PROCEDURE:  CT Abdomen and Pelvis with oral and  IV contrast.



HISTORY:

r/o obstruction



COMPARISON:

Obstructive series performed 7/2/18, CTA abdomen pelvis performed 

5/14/18 



TECHNIQUE:

Contiguous axial images of the abdomen and pelvis. Oral and IV 

contrast was administered. Coronal and Sagittal reformats generated 

and reviewed. 



Contrast dose: 100 mL Visipaque IV



Radiation dose:



Total exam DLP = 207.74 mGy-cm.



This CT exam was performed using one or more of the following dose 

reduction techniques: Automated exposure control, adjustment of the 

mA and/or kV according to patient size, and/or use of iterative 

reconstruction technique.



FINDINGS:





LOWER THORAX:

Bibasilar atelectasis.  There is no visible pleural effusion or 

pneumothorax. 



Partially imaged cardiomegaly with heart predominantly in the right 

hemithorax. 



LIVER:

Unremarkable. No gross lesion or ductal dilatation.



GALLBLADDER AND BILE DUCTS:

Unremarkable. 



PANCREAS:

Unremarkable. No mass. No ductal dilatation.



SPLEEN:

Unremarkable. No splenomegaly. 



ADRENALS:

Nodular hypertrophy, left adrenal gland.  Right adrenal gland appears 

unremarkable. 



KIDNEYS AND URETERS:

The kidneys enhance symmetrically. No hydronephrosis or obstructing 

renal calculus. Multiple large bilateral renal cysts re-identified. 



BLADDER:

The urinary bladder appears unremarkable.



REPRODUCTIVE:

Uterus is absent, presumably due to hysterectomy. 



APPENDIX:

The appendix is not identified.



BOWEL:

The stomach is nondistended. 



The bowel loops appear within normal limits of caliber without 

evidence of intestinal obstruction.  Extensive colonic diverticulosis 

without CT evidence of acute diverticulitis. 



PERITONEUM:

No significant free fluid. No definite free air.



LYMPH NODES:

No bulky lymphadenopathy identified.



VASCULATURE:

No aortic aneurysm. 



BONES:

Multilevel degenerative changes.  Severe scoliosis.  Osseous 

demineralization.  Right hip ORIF hardware with resultant streak 

artifact which limits evaluation of the pelvis.  Old pubic rami 

fractures.  Question vertebral body hemangioma, L3.  Anterolisthesis 

of L4 on L5. 



OTHER FINDINGS:

None. 



IMPRESSION:

Extensive colonic diverticulosis without CT evidence of acute 

diverticulitis.



Multiple large bilateral renal cysts re-identified.



Nodular hypertrophy of the left adrenal gland.



Partially imaged cardiomegaly predominantly within the right 

hemithorax.



Bibasilar atelectasis.



Additional findings as above.

## 2018-09-26 NOTE — C.PDOC
History Of Present Illness


88 y/o female presents to the ED complaining of feeling constipated today. Last 

bowel movement was yesterday. She reports no changes in diet, and has been 

eating normally today. No blood in the stool. Otherwise she denies any vomiting,

diarrhea, fever, chills, or urinary symptoms. 





Time Seen by Provider: 09/26/18 13:05


Chief Complaint (Nursing): Abdominal Pain


History Per: Patient


History/Exam Limitations: no limitations


Onset/Duration Of Symptoms: Days


Current Symptoms Are (Timing): Still Present


Associated Symptoms: Constipation





Past Medical History


Reviewed: Historical Data, Nursing Documentation, Vital Signs


Vital Signs: 





                                Last Vital Signs











Temp  97.8 F   09/26/18 13:04


 


Pulse  67   09/26/18 13:04


 


Resp  20   09/26/18 13:04


 


BP  158/92 H  09/26/18 13:04


 


Pulse Ox  100   09/26/18 13:04














- Medical History


PMH: Arthritis, Atrial Fibrillation, HTN, Chronic Kidney Disease





- [facility] Procedures











CORONAR ARTERIOGR-2 CATH (03/27/07)


RT & LT HEART ANGIOCARD (03/27/07)


RT/LEFT HEART CARD CATH (03/27/07)








Family History: States: Unknown Family Hx





- Social History


Hx Tobacco Use: No


Hx Alcohol Use: No


Hx Substance Use: No





- Immunization History


Hx Tetanus Toxoid Vaccination:  (unk)


Hx Influenza Vaccination: Yes (2017)


Hx Pneumococcal Vaccination:  (unk)





Review Of Systems


Except As Marked, All Systems Reviewed And Found Negative.


Constitutional: Negative for: Fever, Chills


Eyes: Negative for: Vision Change


Cardiovascular: Negative for: Chest Pain


Gastrointestinal: Positive for: Constipation.  Negative for: Nausea, Vomiting, 

Diarrhea


Genitourinary: Negative for: Dysuria, Frequency, Incontinence, Hematuria


Neurological: Negative for: Weakness, Headache





Physical Exam





- Physical Exam


Appears: Non-toxic, No Acute Distress


Skin: Normal Color, Warm, Dry


Head: Atraumatic, Normacephalic


Eye(s): bilateral: Normal Inspection, PERRL, EOMI


Oral Mucosa: Moist


Neck: Normal ROM


Chest: Symmetrical


Cardiovascular: Rhythm Regular, No Murmur


Respiratory: Normal Breath Sounds, No Accessory Muscle Use


Gastrointestinal/Abdominal: Bowel Sounds (positive), Soft, No Tenderness, Hernia

(Right-sided abdominal hernia), Other (Well-healed midline surgical scar)


Extremity: Bilateral: Atraumatic, Normal Color And Temperature, Normal ROM


Neurological/Psych: Oriented x3, Normal Speech





ED Course And Treatment





- Laboratory Results


Result Diagrams: 


                                 09/26/18 14:05





                                 09/26/18 14:05


O2 Sat by Pulse Oximetry: 100 (RA)


Pulse Ox Interpretation: Normal





- CT Scan/US


  ** CT Abd/Pelvis


Other Rad Studies (CT/US): Read By Radiologist, Radiology Report Reviewed


CT/US Interpretation: Accession [id number], Evelyn.  Patient 

NameSIDDHARTH RINCON C / [id number]. Goldie Thakkar MD.  Study 

[id number] 16:08:47Transcriber.  Sex / AgeF / [id number] Scott MD.  [facility] MorrisApproval [id number] 16:46:05.  My 

Comment.  Study Comments.  Report.  PROCEDURE:  CT Abdomen and Pelvis with oral 

and  IV contrast.  HISTORY:  r/o obstruction.  COMPARISON:  Obstructive series 

performed 7/2/18, CTA abdomen pelvis performed 5/14/18.  TECHNIQUE:  Contiguous 

axial images of the abdomen and pelvis. Oral and IV contrast was administered. 

Coronal and Sagittal reformats generated and reviewed.  Contrast dose: 100 mL 

Visipaque IV.  Radiation dose:  Total exam DLP = 207.74 mGy-cm.  This CT exam 

was performed using one or more of the following dose reduction techniques: 

Automated exposure control, adjustment of the mA and/or kV according to patient 

size, and/or use of iterative reconstruction technique.  FINDINGS:  LOWER 

THORAX:  Bibasilar atelectasis.  There is no visible pleural effusion or 

pneumothorax.  Partially imaged cardiomegaly with heart predominantly in the 

right hemithorax.  LIVER:  Unremarkable. No gross lesion or ductal dilatation.  

GALLBLADDER AND BILE DUCTS:  Unremarkable.  PANCREAS:  Unremarkable. No mass. No

ductal dilatation.  SPLEEN:  Unremarkable. No splenomegaly.  ADRENALS:  Nodular 

hypertrophy, left adrenal gland.  Right adrenal gland appears unremarkable.  

KIDNEYS AND URETERS:  The kidneys enhance symmetrically. No hydronephrosis or 

obstructing renal calculus. Multiple large bilateral renal cysts re-identified. 

BLADDER:  The urinary bladder appears unremarkable.  REPRODUCTIVE:  Uterus is 

absent, presumably due to hysterectomy.  APPENDIX:  The appendix is not 

identified.  BOWEL:  The stomach is nondistended.  The bowel loops appear within

normal limits of caliber without evidence of intestinal obstruction.  Extensive 

colonic diverticulosis without CT evidence of acute diverticulitis.  PERITONEUM:

 No significant free fluid. No definite free air.  LYMPH NODES:  No bulky 

lymphadenopathy identified.  VASCULATURE:  No aortic aneurysm.  BONES:  

Multilevel degenerative changes.  Severe scoliosis.  Osseous demineralization.  

Right hip ORIF hardware with resultant streak artifact which limits evaluation 

of the pelvis.  Old pubic rami fractures.  Question vertebral body hemangioma, 

L3.  Anterolisthesis of L4 on L5.  OTHER FINDINGS:  None.  IMPRESSION:  

Extensive colonic diverticulosis without CT evidence of acute diverticulitis.  

Multiple large bilateral renal cysts re-identified.  Nodular hypertrophy of the 

left adrenal gland.  Partially imaged cardiomegaly predominantly within the 

right hemithorax.  Bibasilar atelectasis.  Additional findings as above.





Medical Decision Making


Medical Decision Making: 





Impression: Abdominal pain, Constipation, r/o obstruction





Initial Plan:


--EKG


--Blood work


--Urinalysis


--Urine culture


--CT Abd/Pelvis with PO & IV contrast








Disposition


Counseled Patient/Family Regarding: Studies Performed, Diagnosis





- Disposition


Referrals: 


[facility], [Non-Staff] - 


Disposition: HOME/ ROUTINE


Disposition Time: 16:40


Condition: GOOD


Additional Instructions: 





MCKENZIE MESSINA, thank you for letting us take care of you today. Your 

provider was Gama Keller DO and you were treated for ABD PAIN. The 

emergency medical care you received today was directed at your acute symptoms. 

If you were prescribed any medication, please fill it and take as directed. It 

may take several days for your symptoms to resolve. Return to the Emergency 

Department if your symptoms worsen, do not improve, or if you have any other 

problems.





Please contact your doctor or call one of the physicians/clinics you have been 

referred to that are listed on the Patient Visit Information form that is 

included in your discharge packet. Bring any paperwork you were given at 

discharge with you along with any medications you are taking to your follow up 

visit. Our treatment cannot replace ongoing medical care by a primary care 

provider outside of the emergency department.





Thank you for allowing the [facility] team to be part of your care today.











Increase the fiber in your diet.








Follow up with your primary care doctor in 2-3 dys for re-evaluation and further

management.


Prescriptions: 


Docusate [Colace] 100 mg PO Q8 PRN #20 cap


 PRN Reason: Constipation


Instructions:  High Fiber Diet, Constipation, Adult (DC), Diverticulosis (DC)


Forms:  [facility] (English)





- Clinical Impression


Clinical Impression: 


 Constipation








- Scribe Statement


The provider has reviewed the documentation as recorded by the Scribe (Joy Arzate)


Provider Attestation: 





All medical record entries made by the Scribe were at my direction and 

personally dictated by me. I have reviewed the chart and agree that the record 

accurately reflects my personal performance of the history, physical exam, 

medical decision making, and the department course for this patient. I have also

 personally directed, reviewed, and agree with the discharge instructions and 

disposition.

## 2018-09-28 NOTE — CARD
--------------- APPROVED REPORT --------------





Date of service: 09/26/2018



EKG Measurement

Heart [id number]

[id number]

[id number]

[id number]



<Conclusion>

Atrial fibrillation

Abnormal ECG

## 2020-11-18 NOTE — RAD
Avastin #11 (2 of 2) recommended today after discussion of benefits, risks and alternatives. The injection was given and tolerated well by patient. Post-injection instructions were reviewed and understood by the patient. PROCEDURE:  Radiographs of the chest and abdomen (obstructive series)



HISTORY:

Abdominal pain



COMPARISON:

CT abdomen and pelvis from 07/02/2018



TECHNIQUE:

AP radiograph of the chest, with upright and supine radiographs of 

the abdomen.



FINDINGS:



CHEST:

Lungs: The lungs are clear.



Cardiovascular: Normal size heart. No pulmonary vascular congestion.



Pleura: No pleural fluid. No pneumothorax.



Other findings: None.



ABDOMEN AND PELVIS:

Bowel: There is moderate amount of stool in the colon. The bowel gas 

pattern is nonobstructive. 



Free air: None.



Bones:  Diffuse bone demineralization and severe levoscoliosis in the 

lumbar spine. Postsurgical changes in the right hip. 



Other findings: None.



IMPRESSION:

Constipation.  No evidence of bowel obstruction. 



Clear lungs.